# Patient Record
Sex: FEMALE | Race: ASIAN | Employment: FULL TIME
[De-identification: names, ages, dates, MRNs, and addresses within clinical notes are randomized per-mention and may not be internally consistent; named-entity substitution may affect disease eponyms.]

---

## 2023-07-13 ENCOUNTER — HOSPITAL ENCOUNTER (EMERGENCY)
Facility: HOSPITAL | Age: 28
Discharge: HOME | End: 2023-07-13
Attending: STUDENT IN AN ORGANIZED HEALTH CARE EDUCATION/TRAINING PROGRAM
Payer: COMMERCIAL

## 2023-07-13 VITALS
HEIGHT: 61 IN | RESPIRATION RATE: 19 BRPM | SYSTOLIC BLOOD PRESSURE: 99 MMHG | WEIGHT: 100 LBS | TEMPERATURE: 98.7 F | BODY MASS INDEX: 18.88 KG/M2 | DIASTOLIC BLOOD PRESSURE: 59 MMHG | HEART RATE: 91 BPM | OXYGEN SATURATION: 100 %

## 2023-07-13 DIAGNOSIS — N93.9 UTERINE BLEEDING: ICD-10-CM

## 2023-07-13 DIAGNOSIS — D64.9 ANEMIA, UNSPECIFIED TYPE: Primary | ICD-10-CM

## 2023-07-13 LAB
ALBUMIN SERPL-MCNC: 4.1 G/DL (ref 3.4–5)
ALBUMIN/GLOB SERPL: 1.5 (ref 0.8–1.7)
ALP SERPL-CCNC: 48 U/L (ref 45–117)
ALT SERPL-CCNC: 14 U/L (ref 13–56)
ANION GAP SERPL CALC-SCNC: 4 MMOL/L (ref 3–18)
AST SERPL-CCNC: 9 U/L (ref 10–38)
BASOPHILS # BLD: 0.1 K/UL (ref 0–0.1)
BASOPHILS NFR BLD: 1 % (ref 0–2)
BILIRUB SERPL-MCNC: 0.5 MG/DL (ref 0.2–1)
BUN SERPL-MCNC: 13 MG/DL (ref 7–18)
BUN/CREAT SERPL: 14 (ref 12–20)
CALCIUM SERPL-MCNC: 8.8 MG/DL (ref 8.5–10.1)
CHLORIDE SERPL-SCNC: 106 MMOL/L (ref 100–111)
CO2 SERPL-SCNC: 28 MMOL/L (ref 21–32)
CREAT SERPL-MCNC: 0.95 MG/DL (ref 0.6–1.3)
DIFFERENTIAL METHOD BLD: ABNORMAL
EOSINOPHIL # BLD: 0.1 K/UL (ref 0–0.4)
EOSINOPHIL NFR BLD: 1 % (ref 0–5)
ERYTHROCYTE [DISTWIDTH] IN BLOOD BY AUTOMATED COUNT: 14.1 % (ref 11.6–14.5)
GLOBULIN SER CALC-MCNC: 2.8 G/DL (ref 2–4)
GLUCOSE SERPL-MCNC: 98 MG/DL (ref 74–99)
HCT VFR BLD AUTO: 20.4 % (ref 35–45)
HGB BLD-MCNC: 6.1 G/DL (ref 12–16)
HISTORY CHECK: NORMAL
IMM GRANULOCYTES # BLD AUTO: 0 K/UL (ref 0–0.04)
IMM GRANULOCYTES NFR BLD AUTO: 0 % (ref 0–0.5)
INR PPP: 1.1 (ref 0.8–1.2)
LYMPHOCYTES # BLD: 1.4 K/UL (ref 0.9–3.6)
LYMPHOCYTES NFR BLD: 31 % (ref 21–52)
MCH RBC QN AUTO: 22.4 PG (ref 24–34)
MCHC RBC AUTO-ENTMCNC: 29.9 G/DL (ref 31–37)
MCV RBC AUTO: 75 FL (ref 78–100)
MONOCYTES # BLD: 0.3 K/UL (ref 0.05–1.2)
MONOCYTES NFR BLD: 7 % (ref 3–10)
NEUTS SEG # BLD: 2.7 K/UL (ref 1.8–8)
NEUTS SEG NFR BLD: 60 % (ref 40–73)
NRBC # BLD: 0 K/UL (ref 0–0.01)
NRBC BLD-RTO: 0 PER 100 WBC
PLATELET # BLD AUTO: 432 K/UL (ref 135–420)
PMV BLD AUTO: 10.5 FL (ref 9.2–11.8)
POTASSIUM SERPL-SCNC: 3.8 MMOL/L (ref 3.5–5.5)
PROT SERPL-MCNC: 6.9 G/DL (ref 6.4–8.2)
PROTHROMBIN TIME: 14.1 SEC (ref 11.5–15.2)
RBC # BLD AUTO: 2.72 M/UL (ref 4.2–5.3)
SODIUM SERPL-SCNC: 138 MMOL/L (ref 136–145)
WBC # BLD AUTO: 4.5 K/UL (ref 4.6–13.2)

## 2023-07-13 PROCEDURE — P9016 RBC LEUKOCYTES REDUCED: HCPCS

## 2023-07-13 PROCEDURE — 85610 PROTHROMBIN TIME: CPT

## 2023-07-13 PROCEDURE — 86923 COMPATIBILITY TEST ELECTRIC: CPT

## 2023-07-13 PROCEDURE — 86900 BLOOD TYPING SEROLOGIC ABO: CPT

## 2023-07-13 PROCEDURE — 93005 ELECTROCARDIOGRAM TRACING: CPT | Performed by: STUDENT IN AN ORGANIZED HEALTH CARE EDUCATION/TRAINING PROGRAM

## 2023-07-13 PROCEDURE — 80053 COMPREHEN METABOLIC PANEL: CPT

## 2023-07-13 PROCEDURE — 86850 RBC ANTIBODY SCREEN: CPT

## 2023-07-13 PROCEDURE — 85025 COMPLETE CBC W/AUTO DIFF WBC: CPT

## 2023-07-13 PROCEDURE — 36430 TRANSFUSION BLD/BLD COMPNT: CPT

## 2023-07-13 PROCEDURE — 86901 BLOOD TYPING SEROLOGIC RH(D): CPT

## 2023-07-13 PROCEDURE — 99285 EMERGENCY DEPT VISIT HI MDM: CPT

## 2023-07-13 RX ORDER — SODIUM CHLORIDE 9 MG/ML
INJECTION, SOLUTION INTRAVENOUS PRN
Status: DISCONTINUED | OUTPATIENT
Start: 2023-07-13 | End: 2023-07-14 | Stop reason: HOSPADM

## 2023-07-13 ASSESSMENT — LIFESTYLE VARIABLES
HOW OFTEN DO YOU HAVE A DRINK CONTAINING ALCOHOL: NEVER
HOW MANY STANDARD DRINKS CONTAINING ALCOHOL DO YOU HAVE ON A TYPICAL DAY: PATIENT DOES NOT DRINK

## 2023-07-13 ASSESSMENT — PAIN - FUNCTIONAL ASSESSMENT: PAIN_FUNCTIONAL_ASSESSMENT: NONE - DENIES PAIN

## 2023-07-13 NOTE — CONSENT
Informed Consent for Blood Component Transfusion Note    I have discussed with the patient the rationale for blood component transfusion; its benefits in treating or preventing fatigue, organ damage, or death; and its risk which includes mild transfusion reactions, rare risk of blood borne infection, or more serious but rare reactions. I have discussed the alternatives to transfusion, including the risk and consequences of not receiving transfusion. The patient had an opportunity to ask questions and had agreed to proceed with transfusion of blood components.     Electronically signed by Nazario Kaur DO on 7/13/23 at 1:18 PM EDT

## 2023-07-13 NOTE — ED NOTES
Pt alert + oriented sitting in bed talking with nurse. No complaints or signs of transfusion reaction noted.       Shankar Kirk RN  07/13/23 [id number]

## 2023-07-13 NOTE — ED TRIAGE NOTES
Patient ambulatory to ED, recommended by PCP for blood transfusion. Lab results from preop surgery for fibroidectomy showing hemoglobin at 6.8 and hematocrit at 22.6. Patient states that she does have SOB, dizziness upon standing and palpitations. Able to speak in full sentences, NAD att.

## 2023-07-13 NOTE — ED NOTES
Pt alert oriented ambulatory, reports vaginal bleeding since 10/22. Pt reports shortness of breath with exertion x past week. Pt has Hysteroscopy dilation and curettage +myomectomy with symphion scheduled with MD Britt Lipscomb on 07/26/2023. Pt reports her pre op lab reports showed low hemoglobin they advised she come into ED for possible blood transfusion.       Arnold Jorden, REMA  07/13/23 [id number]

## 2023-07-13 NOTE — ED PROVIDER NOTES
[facility] EMERGENCY DEPT  EMERGENCY DEPARTMENT ENCOUNTER    Patient Name: Prashanth Marte  MRN: [id number]  YOB: 1995  Provider: Tamara Sarmiento DO  PCP: Eusebia Salmon DO   Time/Date of evaluation: 12:42 PM EDT on 23    History of Presenting Illness     Chief Complaint   Patient presents with    Abnormal Lab       History Provided By: Patient     History Krista Patel):   Prashanth Marte is a 24 y.o. female  hx uterine fibroids followed by Dr. Juliana Munson, presents after call from her ob/gyn office concern for outpatient labs showing anemia w/ hgb 6.8 from lab draw . Patient has no history of anemia causing transfusions. She is not on any blood thinners. She has some lightheadedness with walking but no shortness of breath or chest pain currently or syncope. She states that she has had bleeding vaginally ongoing, but today it is very light. She states that 2 days ago it was very heavy in the evening soaking 1-2 pads an hour for several hours. She is not having new abdominal pain, dysuria. Was scheduled for fibroidectomy later this month. No melena or blood in stool. Nursing Notes were all reviewed and agreed with or any disagreements were addressed in the HPI. Past History     Past Medical History:  Past Medical History:   Diagnosis Date    Advance directive discussed with patient 2023    pt denies having one    Exercise tolerance finding 2023    pt states able to climb 2 flights of stairs without SOB or CP    Fibroid, uterine 2023    Full dentures 2023    pt states she has permanment retainer on lower teeth    Wears glasses 2023       Past Surgical History:  Past Surgical History:   Procedure Laterality Date    MOUTH SURGERY      4 teeth removed othertan wisdom teeth    WISDOM TOOTH EXTRACTION         Family History:  No family history on file.     Social History:  Social History     Tobacco Use    Smoking status: Never     Passive exposure: Never    Smokeless tobacco:

## 2023-07-13 NOTE — ED NOTES
Report received from [facility]. This RN assumes care of the pt at this time.       Jeanna Menchaca RN  07/13/23 [id number]

## 2023-07-14 LAB
ABO + RH BLD: NORMAL
BLD PROD TYP BPU: NORMAL
BLD PROD TYP BPU: NORMAL
BLOOD BANK DISPENSE STATUS: NORMAL
BLOOD BANK DISPENSE STATUS: NORMAL
BLOOD GROUP ANTIBODIES SERPL: NORMAL
BPU ID: NORMAL
BPU ID: NORMAL
CALLED TO: NORMAL
CROSSMATCH RESULT: NORMAL
CROSSMATCH RESULT: NORMAL
EKG ATRIAL RATE: 86 BPM
EKG DIAGNOSIS: NORMAL
EKG P AXIS: 68 DEGREES
EKG P-R INTERVAL: 154 MS
EKG Q-T INTERVAL: 366 MS
EKG QRS DURATION: 86 MS
EKG QTC CALCULATION (BAZETT): 437 MS
EKG R AXIS: 83 DEGREES
EKG T AXIS: 51 DEGREES
EKG VENTRICULAR RATE: 86 BPM
SPECIMEN EXP DATE BLD: NORMAL
UNIT DIVISION: 0
UNIT DIVISION: 0

## 2023-07-14 NOTE — ED PROVIDER NOTES
Patient was signed out to me by the previous physician. Please refer to his dictation for more complete history. Patient has a history of chronic anemia secondary to heavy menstrual cycles. Presenting with low hemoglobin. Previous physician initiated blood transfusion and signed the patient out to me awaiting completion of transfusion, reevaluation and likely discharge home. On reevaluation she has received both 3 units of packed red blood cells. She remained asymptomatic, with no active bleeding. Plans to follow-up with Dr. Vito Virgen. DISPOSITION Decision To Discharge 07/13/2023 10:40:08 PM    DISCHARGE NOTE:  Mustapha Cristobal's  results have been reviewed with her. She has been counseled regarding her diagnosis, treatment, and plan. She verbally conveys understanding and agreement of the signs, symptoms, diagnosis, treatment and prognosis and additionally agrees to follow up as discussed. She also agrees with the care-plan and conveys that all of her questions have been answered. I have also provided discharge instructions for her that include: educational information regarding their diagnosis and treatment, and list of reasons why they would want to return to the ED prior to their follow-up appointment, should her condition change. She has been provided with education for proper emergency department utilization. CLINICAL IMPRESSION:  1. Anemia, unspecified type    2.  Uterine bleeding        PLAN:  D/C Home    DISCHARGE MEDICATIONS:  Current Discharge Medication List             Details   etonogestrel (NEXPLANON) 68 MG implant 68 mg by Subdermal route once Indications: birth control             DISCONTINUED MEDICATIONS:  Current Discharge Medication List          PATIENT REFERRED TO:  Follow Up with:  Karen Bragg MD  [address]  [phone]    Schedule an appointment as soon as possible for a visit       [facility] EMERGENCY DEPT  [address]

## 2023-07-17 NOTE — H&P
[facility] [address]  Tel: [phone]  Fax: [phone]    Patient: Michelle Mendoza   YOB: 1995   Birth Sex: Female      Current Gender: Female      Date:  2023 8:05 AM    Visit Type: Office Visit - GYN       This 24year old female presents for ER follow up:. History of Present Illness  1. ER follow up: The symptoms began on 2023 and generally lasts varies. The symptoms are reported as being mild. The symptoms occur constantly. The location is systemic. The symptoms are described as painless. Aggravating factors include none. Relieving factors include none. The patient states the symptoms are acute and are unchanged. Patient presents for a ER follow up. Patient states that she went due to needing a blood transfusion. Patient states she was recommended to get one by Dr. Juliana Morrell. Gynecologic History  Patient is premenopausal. Last menses was 2023.   2023 08:05 AM    Obstetric History  :0.      Parity: Term:0.  Pre-Term: 0. : 0. Livin. Medical History  (Detailed)  Disease Onset Date Comments   Long-term contraception         Surgical History/Management  (Detailed)  Management Date Comments   Nexplanon 2023      Diagnostics History  Status Study Ordered Completed Interpretation  Result / Report   ordered TRANSVAGINAL US, NON-OB 2023       result received TRANSVAGINAL US, NON-OB 2023  See module         Problem List  Problem List reviewed.    Problem Description Onset Date Chronic Clinical Status Notes   Bleeding hemorrhoids 2023 N         Medications (active prior to today)  Medication Instructions Start Date Stop Date Refilled Elsewhere   Nexplanon 68 mg subdermal implant  2023   N   Cytotec 200 mcg tablet place 2 tablets in vaginally at bedtime (no later than 8p) the night prior to procedure 2023 N   ibuprofen 800 mg tablet take 1 Physical Exam  Exam Findings Details   Constitutional Normal No acute distress. Well nourished. Well developed. Respiratory Normal Effort - Normal.   Psychiatric Normal Orientation - Oriented to time, place, person & situation. Appropriate mood and affect. Assessment/Plan  # Detail Type Description    1. Assessment Other specified abnormal uterine bleeding (N93.8). Patient Plan 3 mo AUB up to 2 cycles/mo. I d/w patient possible etiologies, evaluation and management. Obtain TVUS and labs. 2/9/23 TT --> TVUS reviewed w/ patient: see below. She opts for medical management w/ BC. Plans for possible [address] myomectomy if symptoms persist/worsen. 2/27/23 TT -->Medical management. Cont menstrual charting. FU prn. 3/1/23 TT --> Persistent. Obtain CBC. Cont medical management. Consider add-back therapy and ABx if symptoms persist/worsen. 4/25/23 TT --> Persistent. Obtain TVUS w/ labs. 5/23/23 TT --> Persistent AUB. She declined add-back and anti-biotic treatment. She opts for [address] D&C myomectomy w/ Symphion. 6/7/23 TT --> Worsening AUB and symptomatic. Obtain labs. Precautions provided. 7/11/23 TT  - TVUS (2/22/23): AF uterus (7.2 x 4.3 x 3.3 cm); ET (9.2 mm); ovaries wnl. Multiple fibroids: submucosal (1.5 x 1.2 x 1.4 cm), post (1.6cm). Plan Orders Hematology within 2 Weeks for anemia. Reason for referral: anemia, Clinical information/comments: Patient had infusion on 07/13/2023 for hgb of 6. Please follow up care . 2. Assessment Submucous leiomyoma of uterus (D25.0). Patient Plan See above. 3. Assessment Encounter for surveillance of implantable subdermal contraceptive (Z30.46). Patient Plan R/B/A d/w patient. Encouraged BC for hormonal maintenance, menstrual regulation and prevention of future ovarian cysts. Will solidify management pending w/u. 2/9/23 TT --> Patient opts for Nexplanon pending approval. 2/27/23 TT --> Nexplanon inserted w/o difficulties. FU in 6 wks.

## 2023-07-17 NOTE — PERIOP NOTE
Hgb is 6.1 - Patient received 2 units of PRBC'S on 7-13-23 at [facility] ED. Lab report faxed to Dr. Karly Lorenzo office. Notified Dami

## 2023-07-20 ENCOUNTER — ANESTHESIA EVENT (OUTPATIENT)
Facility: HOSPITAL | Age: 28
End: 2023-07-20
Payer: COMMERCIAL

## 2023-07-21 ENCOUNTER — ANESTHESIA (OUTPATIENT)
Facility: HOSPITAL | Age: 28
End: 2023-07-21
Payer: COMMERCIAL

## 2023-07-21 ENCOUNTER — HOSPITAL ENCOUNTER (OUTPATIENT)
Facility: HOSPITAL | Age: 28
Setting detail: OUTPATIENT SURGERY
Discharge: HOME | End: 2023-07-21
Attending: OBSTETRICS & GYNECOLOGY | Admitting: OBSTETRICS & GYNECOLOGY
Payer: COMMERCIAL

## 2023-07-21 VITALS
TEMPERATURE: 97.4 F | DIASTOLIC BLOOD PRESSURE: 67 MMHG | OXYGEN SATURATION: 100 % | WEIGHT: 103.7 LBS | HEART RATE: 78 BPM | SYSTOLIC BLOOD PRESSURE: 106 MMHG | HEIGHT: 61 IN | RESPIRATION RATE: 20 BRPM | BODY MASS INDEX: 19.58 KG/M2

## 2023-07-21 PROBLEM — D25.0 SUBMUCOUS LEIOMYOMA OF UTERUS: Chronic | Status: RESOLVED | Noted: 2023-07-21 | Resolved: 2023-07-21

## 2023-07-21 PROBLEM — N93.9 ABNORMAL UTERINE BLEEDING (AUB): Chronic | Status: ACTIVE | Noted: 2023-07-21

## 2023-07-21 PROBLEM — N94.5 SECONDARY DYSMENORRHEA: Chronic | Status: ACTIVE | Noted: 2023-07-21

## 2023-07-21 PROBLEM — D25.0 SUBMUCOUS LEIOMYOMA OF UTERUS: Chronic | Status: ACTIVE | Noted: 2023-07-21

## 2023-07-21 LAB
HCG UR QL: NEGATIVE
HCT VFR BLD AUTO: 23.4 % (ref 35–45)
HGB BLD-MCNC: 6.9 G/DL (ref 12–16)

## 2023-07-21 PROCEDURE — 81025 URINE PREGNANCY TEST: CPT

## 2023-07-21 PROCEDURE — 7100000000 HC PACU RECOVERY - FIRST 15 MIN: Performed by: OBSTETRICS & GYNECOLOGY

## 2023-07-21 PROCEDURE — 3600000012 HC SURGERY LEVEL 2 ADDTL 15MIN: Performed by: OBSTETRICS & GYNECOLOGY

## 2023-07-21 PROCEDURE — 2720000010 HC SURG SUPPLY STERILE: Performed by: OBSTETRICS & GYNECOLOGY

## 2023-07-21 PROCEDURE — 85014 HEMATOCRIT: CPT

## 2023-07-21 PROCEDURE — 2580000003 HC RX 258: Performed by: OBSTETRICS & GYNECOLOGY

## 2023-07-21 PROCEDURE — 6360000002 HC RX W HCPCS: Performed by: NURSE ANESTHETIST, CERTIFIED REGISTERED

## 2023-07-21 PROCEDURE — 7100000001 HC PACU RECOVERY - ADDTL 15 MIN: Performed by: OBSTETRICS & GYNECOLOGY

## 2023-07-21 PROCEDURE — 3600000002 HC SURGERY LEVEL 2 BASE: Performed by: OBSTETRICS & GYNECOLOGY

## 2023-07-21 PROCEDURE — 7100000010 HC PHASE II RECOVERY - FIRST 15 MIN: Performed by: OBSTETRICS & GYNECOLOGY

## 2023-07-21 PROCEDURE — 88305 TISSUE EXAM BY PATHOLOGIST: CPT

## 2023-07-21 PROCEDURE — 7100000011 HC PHASE II RECOVERY - ADDTL 15 MIN: Performed by: OBSTETRICS & GYNECOLOGY

## 2023-07-21 PROCEDURE — 85018 HEMOGLOBIN: CPT

## 2023-07-21 PROCEDURE — 6360000002 HC RX W HCPCS: Performed by: OBSTETRICS & GYNECOLOGY

## 2023-07-21 PROCEDURE — 2709999900 HC NON-CHARGEABLE SUPPLY: Performed by: OBSTETRICS & GYNECOLOGY

## 2023-07-21 PROCEDURE — 2500000003 HC RX 250 WO HCPCS: Performed by: NURSE ANESTHETIST, CERTIFIED REGISTERED

## 2023-07-21 PROCEDURE — A4217 STERILE WATER/SALINE, 500 ML: HCPCS | Performed by: OBSTETRICS & GYNECOLOGY

## 2023-07-21 PROCEDURE — 36415 COLL VENOUS BLD VENIPUNCTURE: CPT

## 2023-07-21 PROCEDURE — 3700000000 HC ANESTHESIA ATTENDED CARE: Performed by: OBSTETRICS & GYNECOLOGY

## 2023-07-21 PROCEDURE — 3700000001 HC ADD 15 MINUTES (ANESTHESIA): Performed by: OBSTETRICS & GYNECOLOGY

## 2023-07-21 RX ORDER — LABETALOL HYDROCHLORIDE 5 MG/ML
5 INJECTION, SOLUTION INTRAVENOUS EVERY 10 MIN PRN
Status: DISCONTINUED | OUTPATIENT
Start: 2023-07-21 | End: 2023-07-21 | Stop reason: HOSPADM

## 2023-07-21 RX ORDER — ONDANSETRON 2 MG/ML
INJECTION INTRAMUSCULAR; INTRAVENOUS PRN
Status: DISCONTINUED | OUTPATIENT
Start: 2023-07-21 | End: 2023-07-21 | Stop reason: SDUPTHER

## 2023-07-21 RX ORDER — MIDAZOLAM HYDROCHLORIDE 1 MG/ML
INJECTION INTRAMUSCULAR; INTRAVENOUS PRN
Status: DISCONTINUED | OUTPATIENT
Start: 2023-07-21 | End: 2023-07-21 | Stop reason: SDUPTHER

## 2023-07-21 RX ORDER — HYDROMORPHONE HYDROCHLORIDE 1 MG/ML
0.5 INJECTION, SOLUTION INTRAMUSCULAR; INTRAVENOUS; SUBCUTANEOUS EVERY 5 MIN PRN
Status: DISCONTINUED | OUTPATIENT
Start: 2023-07-21 | End: 2023-07-21 | Stop reason: HOSPADM

## 2023-07-21 RX ORDER — DIPHENHYDRAMINE HYDROCHLORIDE 50 MG/ML
12.5 INJECTION INTRAMUSCULAR; INTRAVENOUS
Status: DISCONTINUED | OUTPATIENT
Start: 2023-07-21 | End: 2023-07-21 | Stop reason: HOSPADM

## 2023-07-21 RX ORDER — SODIUM CHLORIDE 0.9 % (FLUSH) 0.9 %
5-40 SYRINGE (ML) INJECTION EVERY 12 HOURS SCHEDULED
Status: DISCONTINUED | OUTPATIENT
Start: 2023-07-21 | End: 2023-07-21 | Stop reason: HOSPADM

## 2023-07-21 RX ORDER — ONDANSETRON 2 MG/ML
4 INJECTION INTRAMUSCULAR; INTRAVENOUS
Status: DISCONTINUED | OUTPATIENT
Start: 2023-07-21 | End: 2023-07-21 | Stop reason: HOSPADM

## 2023-07-21 RX ORDER — SODIUM CHLORIDE 0.9 % (FLUSH) 0.9 %
5-40 SYRINGE (ML) INJECTION PRN
Status: DISCONTINUED | OUTPATIENT
Start: 2023-07-21 | End: 2023-07-21 | Stop reason: HOSPADM

## 2023-07-21 RX ORDER — FENTANYL CITRATE 50 UG/ML
25 INJECTION, SOLUTION INTRAMUSCULAR; INTRAVENOUS EVERY 5 MIN PRN
Status: DISCONTINUED | OUTPATIENT
Start: 2023-07-21 | End: 2023-07-21 | Stop reason: HOSPADM

## 2023-07-21 RX ORDER — SODIUM CHLORIDE, SODIUM LACTATE, POTASSIUM CHLORIDE, CALCIUM CHLORIDE 600; 310; 30; 20 MG/100ML; MG/100ML; MG/100ML; MG/100ML
INJECTION, SOLUTION INTRAVENOUS CONTINUOUS
Status: DISCONTINUED | OUTPATIENT
Start: 2023-07-21 | End: 2023-07-21 | Stop reason: HOSPADM

## 2023-07-21 RX ORDER — PROPOFOL 10 MG/ML
INJECTION, EMULSION INTRAVENOUS PRN
Status: DISCONTINUED | OUTPATIENT
Start: 2023-07-21 | End: 2023-07-21 | Stop reason: SDUPTHER

## 2023-07-21 RX ORDER — EPHEDRINE SULFATE/0.9% NACL/PF 50 MG/5 ML
SYRINGE (ML) INTRAVENOUS PRN
Status: DISCONTINUED | OUTPATIENT
Start: 2023-07-21 | End: 2023-07-21 | Stop reason: SDUPTHER

## 2023-07-21 RX ORDER — MEPERIDINE HYDROCHLORIDE 50 MG/ML
12.5 INJECTION INTRAMUSCULAR; INTRAVENOUS; SUBCUTANEOUS EVERY 5 MIN PRN
Status: DISCONTINUED | OUTPATIENT
Start: 2023-07-21 | End: 2023-07-21 | Stop reason: HOSPADM

## 2023-07-21 RX ORDER — MIDAZOLAM HYDROCHLORIDE 2 MG/2ML
2 INJECTION, SOLUTION INTRAMUSCULAR; INTRAVENOUS
Status: DISCONTINUED | OUTPATIENT
Start: 2023-07-21 | End: 2023-07-21 | Stop reason: HOSPADM

## 2023-07-21 RX ORDER — PROCHLORPERAZINE EDISYLATE 5 MG/ML
5 INJECTION INTRAMUSCULAR; INTRAVENOUS
Status: DISCONTINUED | OUTPATIENT
Start: 2023-07-21 | End: 2023-07-21 | Stop reason: HOSPADM

## 2023-07-21 RX ORDER — DEXAMETHASONE SODIUM PHOSPHATE 4 MG/ML
INJECTION, SOLUTION INTRA-ARTICULAR; INTRALESIONAL; INTRAMUSCULAR; INTRAVENOUS; SOFT TISSUE PRN
Status: DISCONTINUED | OUTPATIENT
Start: 2023-07-21 | End: 2023-07-21 | Stop reason: SDUPTHER

## 2023-07-21 RX ORDER — BUPIVACAINE HYDROCHLORIDE 2.5 MG/ML
INJECTION, SOLUTION EPIDURAL; INFILTRATION; INTRACAUDAL PRN
Status: DISCONTINUED | OUTPATIENT
Start: 2023-07-21 | End: 2023-07-21 | Stop reason: ALTCHOICE

## 2023-07-21 RX ORDER — FENTANYL CITRATE 50 UG/ML
INJECTION, SOLUTION INTRAMUSCULAR; INTRAVENOUS PRN
Status: DISCONTINUED | OUTPATIENT
Start: 2023-07-21 | End: 2023-07-21 | Stop reason: SDUPTHER

## 2023-07-21 RX ORDER — SODIUM CHLORIDE 9 MG/ML
INJECTION, SOLUTION INTRAVENOUS PRN
Status: DISCONTINUED | OUTPATIENT
Start: 2023-07-21 | End: 2023-07-21 | Stop reason: HOSPADM

## 2023-07-21 RX ORDER — LIDOCAINE HYDROCHLORIDE 20 MG/ML
INJECTION, SOLUTION INTRAVENOUS PRN
Status: DISCONTINUED | OUTPATIENT
Start: 2023-07-21 | End: 2023-07-21 | Stop reason: SDUPTHER

## 2023-07-21 RX ADMIN — PROPOFOL 140 MG: 10 INJECTION, EMULSION INTRAVENOUS at 15:09

## 2023-07-21 RX ADMIN — Medication 5 MG: at 15:29

## 2023-07-21 RX ADMIN — ONDANSETRON HYDROCHLORIDE 4 MG: 2 INJECTION INTRAMUSCULAR; INTRAVENOUS at 15:13

## 2023-07-21 RX ADMIN — SODIUM CHLORIDE, SODIUM LACTATE, POTASSIUM CHLORIDE, AND CALCIUM CHLORIDE: 600; 310; 30; 20 INJECTION, SOLUTION INTRAVENOUS at 13:29

## 2023-07-21 RX ADMIN — SODIUM CHLORIDE, SODIUM LACTATE, POTASSIUM CHLORIDE, AND CALCIUM CHLORIDE: 600; 310; 30; 20 INJECTION, SOLUTION INTRAVENOUS at 15:59

## 2023-07-21 RX ADMIN — DEXAMETHASONE SODIUM PHOSPHATE 4 MG: 4 INJECTION, SOLUTION INTRAMUSCULAR; INTRAVENOUS at 15:13

## 2023-07-21 RX ADMIN — LIDOCAINE HYDROCHLORIDE 100 MG: 20 INJECTION, SOLUTION INTRAVENOUS at 15:09

## 2023-07-21 RX ADMIN — MIDAZOLAM 2 MG: 1 INJECTION INTRAMUSCULAR; INTRAVENOUS at 15:05

## 2023-07-21 RX ADMIN — FENTANYL CITRATE 50 MCG: 50 INJECTION, SOLUTION INTRAMUSCULAR; INTRAVENOUS at 15:14

## 2023-07-21 ASSESSMENT — PAIN - FUNCTIONAL ASSESSMENT: PAIN_FUNCTIONAL_ASSESSMENT: 0-10

## 2023-07-21 ASSESSMENT — PAIN SCALES - GENERAL
PAINLEVEL_OUTOF10: 0
PAINLEVEL_OUTOF10: 0

## 2023-07-21 NOTE — PERIOP NOTE
Notified Dr. Brigitte Martinez of patient's low hgb of 6.1 on 7/13/23 and subsequent transfusion the same day. Repeat H&H is 8.2 on 7/17/23. Per Dr. Brigitte Martinez, no type and screen or repeat hgb needed unless patient is feeling symptomatic. Patient states she does not feel symptomatic in preop.

## 2023-07-21 NOTE — ANESTHESIA PRE PROCEDURE
07/12/23 100 lb (45.4 kg)     There is no height or weight on file to calculate BMI.    CBC:   Lab Results   Component Value Date/Time    WBC 4.5 07/13/2023 12:21 PM    RBC 2.72 07/13/2023 12:21 PM    HGB 6.1 07/13/2023 12:21 PM    HCT 20.4 07/13/2023 12:21 PM    MCV 75.0 07/13/2023 12:21 PM    RDW 14.1 07/13/2023 12:21 PM     07/13/2023 12:21 PM       CMP:   Lab Results   Component Value Date/Time     07/13/2023 12:21 PM    K 3.8 07/13/2023 12:21 PM     07/13/2023 12:21 PM    CO2 28 07/13/2023 12:21 PM    BUN 13 07/13/2023 12:21 PM    CREATININE 0.95 07/13/2023 12:21 PM    LABGLOM >60 07/13/2023 12:21 PM    GLUCOSE 98 07/13/2023 12:21 PM    PROT 6.9 07/13/2023 12:21 PM    CALCIUM 8.8 07/13/2023 12:21 PM    BILITOT 0.5 07/13/2023 12:21 PM    ALKPHOS 48 07/13/2023 12:21 PM    AST 9 07/13/2023 12:21 PM    ALT 14 07/13/2023 12:21 PM       POC Tests: No results for input(s): POCGLU, POCNA, POCK, POCCL, POCBUN, POCHEMO, POCHCT in the last 72 hours.     Coags:   Lab Results   Component Value Date/Time    PROTIME 14.1 07/13/2023 12:21 PM    INR 1.1 07/13/2023 12:21 PM       HCG (If Applicable): No results found for: PREGTESTUR, PREGSERUM, HCG, HCGQUANT     ABGs: No results found for: PHART, PO2ART, PYR5AFA, WWG9NVK, BEART, R5OUWUWX     Type & Screen (If Applicable):  No results found for: LABABO, LABRH    Drug/Infectious Status (If Applicable):  No results found for: HIV, HEPCAB    COVID-19 Screening (If Applicable): No results found for: COVID19        Anesthesia Evaluation  Patient summary reviewed and Nursing notes reviewed  Airway: Mallampati: III  TM distance: <3 FB   Neck ROM: full  Mouth opening: > = 3 FB   Dental: normal exam         Pulmonary:Negative Pulmonary ROS breath sounds clear to auscultation                             Cardiovascular:Negative CV ROS  Exercise tolerance: good (>4 METS),           Rhythm: regular  Rate: normal                    Neuro/Psych:   Negative Neuro/Psych ROS

## 2023-07-21 NOTE — PERIOP NOTE
TRANSFER - IN REPORT:    Verbal report received from ORN & CRNA on Gladystine Risen  being received from OR  for routine post-op      Report consisted of patient's Situation, Background, Assessment and   Recommendations(SBAR). Information from the following report(s) Nurse Handoff Report was reviewed with the receiving nurse. Opportunity for questions and clarification was provided. Assessment completed upon patient's arrival to unit and care assumed.

## 2023-07-21 NOTE — PERIOP NOTE
Dr. Hector Rosas aware of pt's low hgb of 6.1 on 7/13/23. Patient received a blood transfusion the same day and repeated Hgb on 7/17/23 was 8.2. Patient reports less bleeding since her transfusion. Per Dr. Jessica Jackson, okay to proceed and no repeat H&H needed.

## 2023-07-21 NOTE — INTERVAL H&P NOTE
Update History & Physical    The patient's History and Physical of July 17, 2023 was reviewed with the patient and I examined the patient. There was no change. The surgical site was confirmed by the patient and me. Plan: The risks, benefits, expected outcome, and alternative to the recommended procedure have been discussed with the patient. Patient understands and wants to proceed with the procedure.      Electronically signed by Vidya Galindo MD on 7/21/2023 at 2:50 PM

## 2023-07-22 NOTE — OP NOTE
[facility] [place]  OPERATIVE REPORT    Name:  Dahiana Cool  MR#:   [id number]  :  1995  ACCOUNT #:  [de-identified]  DATE OF SERVICE:  2023    PREOPERATIVE DIAGNOSES:  1. Abnormal uterine bleeding. 2.  Severe anemia. 3.  Submucosal leiomyomas. 4.  Subdermal contraception. POSTOPERATIVE DIAGNOSES:  1. Abnormal uterine bleeding. 2.  Severe anemia. 3.  Submucosal leiomyomas. 4.  Subdermal contraception. PROCEDURE PERFORMED:  1. Hysteroscopy D and C.  2.  Myomectomy with Symphion. SURGEON:  Sarah Mendoza MD    ANESTHESIOLOGIST:  Margaret Quan MD    ASSISTANT:  Joel Mejía ***. ANESTHESIA:  General and paracervical block. COMPLICATIONS:  None. SPECIMENS REMOVED:  Submucosal leiomyomas. IMPLANTS:  None. ESTIMATED BLOOD LOSS:  Less than 20 mL. IV FLUIDS:  1000 mL lactated Ringer's. FLUID DEFICIT:  1500 mL lactated Ringer's. FINDINGS:  Anteverted uterus with endometrial cavity completely obstructed with two large submucosal leiomyomas, one coming from the right low uterine segment obstructing all the way up to mid cavity and second submucosal leiomyoma noted on the left posterior fundal aspect  from the 3 o' clock to 6 o' clock quadrant. Both ostia were not well visualized secondary to the fibroid. By end of case, the endometrial cavity was without any obstructing fibroid within overall clear cavity demonstrating a slight rotation towards the patient's right. Endocervical cavity unremarkable. INDICATIONS:  This is a 59-year-old with complaints of persistent abnormal uterine bleeding up to 2 cycles per month that had worsened since insertion of a Nexplanon subdermal contraception device.   She had transvaginal ultrasound on 2023 demonstrating anteflexed uterus 7.2 x 4.3 x 3.3 cm, endometrial thickness 9.2 mm, ovaries normal bilaterally, multiple fibroids submucosa 1.5 and posterior 1.6 cm and she has become severely anemic with hemoglobin all the way down to required blood transfusion of 2 units last week, most recent hemoglobin was 8.2 and she is not currently symptomatic, compliant with her iron supplementation. All these findings were reviewed with the patient. Risks, benefits, and alternatives were discussed and she opted for surgical management as stated above. All questions were answered prior to surgical start time. PROCEDURE:  The patient was taken to the OR where anesthesia was obtained without difficulty. She was prepared and draped in the usual sterile fashion in dorsal lithotomy position with legs in stirrups. A weighted speculum was placed in the vagina and Lino retractor in the anterior fornix to expose the cervix. The anterior lip of the cervix was grasped with a single-tooth tenaculum. A paracervical block was performed using 0.25% Marcaine plain with epinephrine 10 mL total injected. The cervical os was then gradually dilated to allow introduction of hysteroscope. This was advanced into the uterus under direct visualization with the water running. The above findings were noted. The Symphion myomectomy device was then advanced through the hysteroscope and device was activated with above findings noted. Instruments were removed at the end of the case. Excellent hemostasis was assured. The patient tolerated the procedure well. Sponge, lap, needle, and instrument counts were correct x2. She was taken to recovery area in stable condition. Karen Bragg MD      [id number]/  D:  07/21/2023 16:22  T:  07/22/2023 1:20  JOB #:  [id number]

## (undated) DEVICE — BASIC SINGLE BASIN 1-LF: Brand: MEDLINE INDUSTRIES, INC.

## (undated) DEVICE — SYMPHION OPERATIVE HYSTEROSCOPY SYSTEM RESECTING DEVICE FOR USE WITH SYMPHION CONTROLLER: Brand: SYMPHION RESECTING DEVICE

## (undated) DEVICE — DECANTING SET

## (undated) DEVICE — SYMPHION OPERATIVE HYSTEROSCOPY SYSTEM FLUID MANAGEMENT ACCESSORIES FOR USE WITH SYMPHION CONTROLLER: Brand: SYMPHION FLUID MANAGEMENT ACCESSORIES

## (undated) DEVICE — SOLUTION IV LACTATED RINGERS INJECTION USP

## (undated) DEVICE — GARMENT,MEDLINE,DVT,INT,CALF,MED, GEN2: Brand: MEDLINE

## (undated) DEVICE — D&C HYSTEROSCOPY: Brand: MEDLINE INDUSTRIES, INC.

## (undated) DEVICE — 1LYRTR 16FR10ML100%SIL UMS SNP: Brand: MEDLINE INDUSTRIES, INC.

## (undated) DEVICE — GLOVE SURG SZ 65 THK91MIL LTX FREE SYN POLYISOPRENE

## (undated) DEVICE — SOLUTION IRRIG 3000ML 0.9% SOD CHL FLX CONT 0797208] ICU MEDICAL INC]